# Patient Record
Sex: MALE | Race: WHITE | NOT HISPANIC OR LATINO | Employment: FULL TIME | URBAN - METROPOLITAN AREA
[De-identification: names, ages, dates, MRNs, and addresses within clinical notes are randomized per-mention and may not be internally consistent; named-entity substitution may affect disease eponyms.]

---

## 2017-01-26 ENCOUNTER — ALLSCRIPTS OFFICE VISIT (OUTPATIENT)
Dept: OTHER | Facility: OTHER | Age: 32
End: 2017-01-26

## 2018-01-13 VITALS
HEIGHT: 66 IN | DIASTOLIC BLOOD PRESSURE: 84 MMHG | BODY MASS INDEX: 28.51 KG/M2 | TEMPERATURE: 98.2 F | WEIGHT: 177.38 LBS | SYSTOLIC BLOOD PRESSURE: 136 MMHG

## 2019-02-20 ENCOUNTER — OFFICE VISIT (OUTPATIENT)
Dept: FAMILY MEDICINE CLINIC | Facility: CLINIC | Age: 34
End: 2019-02-20
Payer: COMMERCIAL

## 2019-02-20 VITALS
SYSTOLIC BLOOD PRESSURE: 120 MMHG | TEMPERATURE: 98 F | HEART RATE: 69 BPM | HEIGHT: 67 IN | BODY MASS INDEX: 27.31 KG/M2 | WEIGHT: 174 LBS | DIASTOLIC BLOOD PRESSURE: 90 MMHG | OXYGEN SATURATION: 97 %

## 2019-02-20 DIAGNOSIS — Q54.9 HYPOSPADIAS IN MALE: Primary | ICD-10-CM

## 2019-02-20 DIAGNOSIS — Z87.710 HISTORY OF HYPOSPADIAS: ICD-10-CM

## 2019-02-20 PROCEDURE — 99213 OFFICE O/P EST LOW 20 MIN: CPT | Performed by: FAMILY MEDICINE

## 2019-02-20 PROCEDURE — 3008F BODY MASS INDEX DOCD: CPT | Performed by: FAMILY MEDICINE

## 2019-02-20 NOTE — PROGRESS NOTES
Assessment/Plan:    Likely this is a recurrence of hypospadias  Unfortunately he was unable to give a urine sample in the office  We have given him a sterile urine cup and asked him to return with a sample  Urology referral was made  History of hypospadias  Likely he has recurrence  Urology referral was given       Diagnoses and all orders for this visit:    Hypospadias in male  -     Ambulatory referral to Urology; Future    History of hypospadias          Subjective:      Patient ID: Mela Burgos is a 29 y o  male  He is here with urinary symptoms which have been occurring over the past year  He notes sometimes difficulty starting the stream and sometimes the stream is intermittent  Also urinary stream sometimes stands out  He has history of microscopic blood in the urine chronically  There is history of surgery for hypospadia  when he was in grade school  He is a  and he has DOT physical exams every 2 years  When the microscopic blood was 1st detected, he had workup with ultrasound which was normal   He denies history of UTI or kidney infection  No fever or chills  No hx STD or penile discharge      The following portions of the patient's history were reviewed and updated as appropriate: allergies, current medications, past family history, past medical history, past social history, past surgical history and problem list     Review of Systems   Constitutional: Negative for chills and fever  Respiratory: Negative for cough  Cardiovascular: Negative for chest pain  Gastrointestinal: Negative for abdominal pain  Genitourinary: Positive for difficulty urinating  Negative for discharge, dysuria, frequency and urgency  Objective:      /90   Pulse 69   Temp 98 °F (36 7 °C) (Tympanic)   Ht 5' 7" (1 702 m)   Wt 78 9 kg (174 lb)   SpO2 97%   BMI 27 25 kg/m²          Physical Exam   Constitutional: He appears well-developed and well-nourished     HENT:   Head: Normocephalic and atraumatic  Cardiovascular: Normal rate and regular rhythm  Pulmonary/Chest: Effort normal and breath sounds normal    Abdominal: Soft  Bowel sounds are normal  He exhibits no mass  There is no tenderness  Nursing note and vitals reviewed

## 2019-03-07 ENCOUNTER — CONSULT (OUTPATIENT)
Dept: UROLOGY | Facility: CLINIC | Age: 34
End: 2019-03-07
Payer: COMMERCIAL

## 2019-03-07 VITALS
HEART RATE: 66 BPM | WEIGHT: 176.4 LBS | SYSTOLIC BLOOD PRESSURE: 128 MMHG | DIASTOLIC BLOOD PRESSURE: 82 MMHG | BODY MASS INDEX: 27.69 KG/M2 | HEIGHT: 67 IN

## 2019-03-07 DIAGNOSIS — Q64.33 CONGENITAL MEATAL STENOSIS: Primary | ICD-10-CM

## 2019-03-07 DIAGNOSIS — Q54.9 HYPOSPADIAS IN MALE: ICD-10-CM

## 2019-03-07 PROCEDURE — 99204 OFFICE O/P NEW MOD 45 MIN: CPT | Performed by: UROLOGY

## 2019-03-07 NOTE — LETTER
March 7, 2019     Desiree Tran MD  [id number]   [address] [facility]    Patient: Dinah Austin   YOB: 1985   Date of Visit: 3/7/2019       Dear Dr Hernadez Shall: Thank you for referring Yessy Greene to me for evaluation  Below are my notes for this consultation  If you have questions, please do not hesitate to call me  I look forward to following your patient along with you  Sincerely,        Magnolia Portillo MD        CC: No Recipients  Magnolia Portillo MD  3/7/2019  2:46 PM  Sign at close encounter  3/7/2019    Dinah Austin  1985  [id number]        Assessment  Meatal stenosis    Plan  Discussed findings with the patient  He likely has a fossa navicularis stricture related to his prior instrumentation and meatoplasty surgery  Recommendation is for cystoscopy and urethral dilation  Explained that this is typically done under local anesthetic in the office  I did offer him oral anti anxiety medication but he declined  Discussed technique, risks, benefits  He agrees and wishes to proceed with office cystoscopy and urethral dilation  Will require metal sounds to dilate the distal urethra  History of Present Illness  Monique Guerrero is a 29 y o  male with reported history of hypospadias, complaining of urinary symptoms  He has a history of childhood surgery which is reported as hypospadias repair  However on discussion with the patient, it sounds as though he had meatal stenosis and meatoplasty  He reports no problems since childhood, however for little more than 1 year, he has complained of weak urinary stream, hesitancy, straining of urine  He has no pain  He does not have gross hematuria, but reports history of microscopic hematuria in the past   He was asked to have urine sample but did not perform this          AUA SYMPTOM SCORE      Most Recent Value   AUA SYMPTOM SCORE   How often have you had a sensation of not emptying your bladder completely after you finished urinating? 1   How often have you had to urinate again less than two hours after you finished urinating? 3   How often have you found you stopped and started again several times when you urinate? 4   How often have you found it difficult to postpone urination? 0   How often have you had a weak urinary stream?  5   How often have you had to push or strain to begin urination? 1   How many times did you most typically get up to urinate from the time you went to bed at night until the time you got up in the morning? 1   Quality of Life: If you were to spend the rest of your life with your urinary condition just the way it is now, how would you feel about that?  3   AUA SYMPTOM SCORE  15          Review of Systems  Review of Systems   Constitutional: Negative  HENT: Negative  Respiratory: Negative  Cardiovascular: Negative  Gastrointestinal: Negative  Genitourinary:        As per HPI   Musculoskeletal: Negative  Skin: Negative  Neurological: Negative  Hematological: Negative  Past Medical History  History reviewed  No pertinent past medical history      Past Social History  Past Surgical History:   Procedure Laterality Date    URETHRA SURGERY      age 7 procedure to enlarge urethra       Past Family History  Family History   Problem Relation Age of Onset    No Known Problems Mother     No Known Problems Father        Past Social history  Social History     Socioeconomic History    Marital status: Single     Spouse name: Not on file    Number of children: Not on file    Years of education: Not on file    Highest education level: Not on file   Occupational History    Not on file   Social Needs    Financial resource strain: Not on file    Food insecurity:     Worry: Not on file     Inability: Not on file    Transportation needs:     Medical: Not on file     Non-medical: Not on file   Tobacco Use    Smoking status: Former Smoker     Last attempt to quit: 2012     Years since quittin 1    Smokeless tobacco: Never Used   Substance and Sexual Activity    Alcohol use: Yes     Alcohol/week: 1 2 oz     Types: 2 Standard drinks or equivalent per week     Comment: socially    Drug use: Not Currently    Sexual activity: Not on file   Lifestyle    Physical activity:     Days per week: Not on file     Minutes per session: Not on file    Stress: Not on file   Relationships    Social connections:     Talks on phone: Not on file     Gets together: Not on file     Attends Yarsanism service: Not on file     Active member of club or organization: Not on file     Attends meetings of clubs or organizations: Not on file     Relationship status: Not on file    Intimate partner violence:     Fear of current or ex partner: Not on file     Emotionally abused: Not on file     Physically abused: Not on file     Forced sexual activity: Not on file   Other Topics Concern    Not on file   Social History Narrative    Not on file     Social History     Tobacco Use   Smoking Status Former Smoker    Last attempt to quit: 2012    Years since quittin 1   Smokeless Tobacco Never Used       Current Medications  No current outpatient medications on file  No current facility-administered medications for this visit  Allergies  No Known Allergies    Past Medical History, Social History, Family History, medications and allergies were reviewed  Vitals  Vitals:    19 1412   BP: 128/82   BP Location: Left arm   Patient Position: Sitting   Cuff Size: Adult   Pulse: 66   Weight: 80 kg (176 lb 6 4 oz)   Height: 5' 7" (1 702 m)       Physical Exam  Physical Exam   Constitutional: He is oriented to person, place, and time  He appears well-developed and well-nourished  Cardiovascular: Normal rate  Pulmonary/Chest: Effort normal    Abdominal: Soft     Genitourinary:   Genitourinary Comments: Meatus in appropriate location, which indicates that he did not have hypospadias  On palpation there is a firmness proximal to the meatus likely in the area of the fossa navicularis  Testes descended and normal bilaterally  Musculoskeletal: Normal range of motion  Neurological: He is alert and oriented to person, place, and time  Skin: Skin is warm, dry and intact  Psychiatric: He has a normal mood and affect  Vitals reviewed          Results  No results found for: PSA  Lab Results   Component Value Date    GLUCOSE 98 02/03/2014    CALCIUM 9 1 02/03/2014     02/03/2014    K 4 1 02/03/2014    CO2 32 (H) 02/03/2014     02/03/2014    BUN 13 02/03/2014    CREATININE 0 95 02/03/2014     Lab Results   Component Value Date    WBC 6 42 02/03/2014    HGB 15 9 02/03/2014    HCT 47 8 02/03/2014    MCV 95 02/03/2014     02/03/2014

## 2019-03-07 NOTE — PROGRESS NOTES
3/7/2019    Dipika Amato  1985  [id number]        Assessment  Meatal stenosis    Plan  Discussed findings with the patient  He likely has a fossa navicularis stricture related to his prior instrumentation and meatoplasty surgery  Recommendation is for cystoscopy and urethral dilation  Explained that this is typically done under local anesthetic in the office  I did offer him oral anti anxiety medication but he declined  Discussed technique, risks, benefits  He agrees and wishes to proceed with office cystoscopy and urethral dilation  Will require metal sounds to dilate the distal urethra  History of Present Illness  Adiel Moore is a 29 y o  male with reported history of hypospadias, complaining of urinary symptoms  He has a history of childhood surgery which is reported as hypospadias repair  However on discussion with the patient, it sounds as though he had meatal stenosis and meatoplasty  He reports no problems since childhood, however for little more than 1 year, he has complained of weak urinary stream, hesitancy, straining of urine  He has no pain  He does not have gross hematuria, but reports history of microscopic hematuria in the past   He was asked to have urine sample but did not perform this  AUA SYMPTOM SCORE      Most Recent Value   AUA SYMPTOM SCORE   How often have you had a sensation of not emptying your bladder completely after you finished urinating? 1   How often have you had to urinate again less than two hours after you finished urinating? 3   How often have you found you stopped and started again several times when you urinate? 4   How often have you found it difficult to postpone urination? 0   How often have you had a weak urinary stream?  5   How often have you had to push or strain to begin urination? 1   How many times did you most typically get up to urinate from the time you went to bed at night until the time you got up in the morning?   1   Quality of Life: If you were to spend the rest of your life with your urinary condition just the way it is now, how would you feel about that?  3   AUA SYMPTOM SCORE  15          Review of Systems  Review of Systems   Constitutional: Negative  HENT: Negative  Respiratory: Negative  Cardiovascular: Negative  Gastrointestinal: Negative  Genitourinary:        As per HPI   Musculoskeletal: Negative  Skin: Negative  Neurological: Negative  Hematological: Negative  Past Medical History  History reviewed  No pertinent past medical history  Past Social History  Past Surgical History:   Procedure Laterality Date    URETHRA SURGERY      age 7 procedure to enlarge urethra       Past Family History  Family History   Problem Relation Age of Onset    No Known Problems Mother     No Known Problems Father        Past Social history  Social History     Socioeconomic History    Marital status: Single     Spouse name: Not on file    Number of children: Not on file    Years of education: Not on file    Highest education level: Not on file   Occupational History    Not on file   Social Needs    Financial resource strain: Not on file    Food insecurity:     Worry: Not on file     Inability: Not on file    Transportation needs:     Medical: Not on file     Non-medical: Not on file   Tobacco Use    Smoking status: Former Smoker     Last attempt to quit: 2012     Years since quittin 1    Smokeless tobacco: Never Used   Substance and Sexual Activity    Alcohol use:  Yes     Alcohol/week: 1 2 oz     Types: 2 Standard drinks or equivalent per week     Comment: socially    Drug use: Not Currently    Sexual activity: Not on file   Lifestyle    Physical activity:     Days per week: Not on file     Minutes per session: Not on file    Stress: Not on file   Relationships    Social connections:     Talks on phone: Not on file     Gets together: Not on file     Attends [employer] service: Not on file Active member of club or organization: Not on file     Attends meetings of clubs or organizations: Not on file     Relationship status: Not on file    Intimate partner violence:     Fear of current or ex partner: Not on file     Emotionally abused: Not on file     Physically abused: Not on file     Forced sexual activity: Not on file   Other Topics Concern    Not on file   Social History Narrative    Not on file     Social History     Tobacco Use   Smoking Status Former Smoker    Last attempt to quit: 2012    Years since quittin 1   Smokeless Tobacco Never Used       Current Medications  No current outpatient medications on file  No current facility-administered medications for this visit  Allergies  No Known Allergies    Past Medical History, Social History, Family History, medications and allergies were reviewed  Vitals  Vitals:    19 1412   BP: 128/82   BP Location: Left arm   Patient Position: Sitting   Cuff Size: Adult   Pulse: 66   Weight: 80 kg (176 lb 6 4 oz)   Height: 5' 7" (1 702 m)       Physical Exam  Physical Exam   Constitutional: He is oriented to person, place, and time  He appears well-developed and well-nourished  Cardiovascular: Normal rate  Pulmonary/Chest: Effort normal    Abdominal: Soft  Genitourinary:   Genitourinary Comments: Meatus in appropriate location, which indicates that he did not have hypospadias  On palpation there is a firmness proximal to the meatus likely in the area of the fossa navicularis  Testes descended and normal bilaterally  Musculoskeletal: Normal range of motion  Neurological: He is alert and oriented to person, place, and time  Skin: Skin is warm, dry and intact  Psychiatric: He has a normal mood and affect  Vitals reviewed          Results  No results found for: PSA  Lab Results   Component Value Date    GLUCOSE 98 2014    CALCIUM 9 1 2014     2014    K 4 1 2014    CO2 32 (H) 02/03/2014     02/03/2014    BUN 13 02/03/2014    CREATININE 0 95 02/03/2014     Lab Results   Component Value Date    WBC 6 42 02/03/2014    HGB 15 9 02/03/2014    HCT 47 8 02/03/2014    MCV 95 02/03/2014     02/03/2014

## 2019-04-23 ENCOUNTER — PROCEDURE VISIT (OUTPATIENT)
Dept: UROLOGY | Facility: CLINIC | Age: 34
End: 2019-04-23
Payer: COMMERCIAL

## 2019-04-23 VITALS
HEIGHT: 67 IN | HEART RATE: 64 BPM | SYSTOLIC BLOOD PRESSURE: 130 MMHG | DIASTOLIC BLOOD PRESSURE: 86 MMHG | BODY MASS INDEX: 27.47 KG/M2 | WEIGHT: 175 LBS

## 2019-04-23 DIAGNOSIS — Q64.33 CONGENITAL MEATAL STENOSIS: Primary | ICD-10-CM

## 2019-04-23 LAB
POST-VOID RESIDUAL VOLUME, ML POC: 58 ML
SL AMB  POCT GLUCOSE, UA: NORMAL
SL AMB LEUKOCYTE ESTERASE,UA: NORMAL
SL AMB POCT BILIRUBIN,UA: NORMAL
SL AMB POCT BLOOD,UA: NORMAL
SL AMB POCT CLARITY,UA: CLEAR
SL AMB POCT COLOR,UA: YELLOW
SL AMB POCT KETONES,UA: NORMAL
SL AMB POCT NITRITE,UA: NORMAL
SL AMB POCT PH,UA: 7
SL AMB POCT SPECIFIC GRAVITY,UA: 1
SL AMB POCT URINE PROTEIN: NORMAL
SL AMB POCT UROBILINOGEN: 0.2

## 2019-04-23 PROCEDURE — 51741 ELECTRO-UROFLOWMETRY FIRST: CPT | Performed by: UROLOGY

## 2019-04-23 PROCEDURE — 81002 URINALYSIS NONAUTO W/O SCOPE: CPT | Performed by: UROLOGY

## 2019-04-23 PROCEDURE — 52000 CYSTOURETHROSCOPY: CPT | Performed by: UROLOGY

## 2019-04-23 PROCEDURE — 51798 US URINE CAPACITY MEASURE: CPT | Performed by: UROLOGY

## 2019-12-23 ENCOUNTER — TELEPHONE (OUTPATIENT)
Dept: FAMILY MEDICINE CLINIC | Facility: CLINIC | Age: 34
End: 2019-12-23

## 2019-12-23 NOTE — TELEPHONE ENCOUNTER
Please attempt to call and schedule pt for a Physical  Last seen was 2/2019  Had and appt schedule in sept 2019 and had canceled  Has no pending appt scheduled as of today 12/23/2019  Thank you

## 2021-06-04 ENCOUNTER — OFFICE VISIT (OUTPATIENT)
Dept: URGENT CARE | Age: 36
End: 2021-06-04
Payer: COMMERCIAL

## 2021-06-04 VITALS
TEMPERATURE: 98.2 F | BODY MASS INDEX: 28.09 KG/M2 | OXYGEN SATURATION: 97 % | WEIGHT: 179 LBS | HEIGHT: 67 IN | HEART RATE: 76 BPM | SYSTOLIC BLOOD PRESSURE: 158 MMHG | RESPIRATION RATE: 16 BRPM | DIASTOLIC BLOOD PRESSURE: 92 MMHG

## 2021-06-04 DIAGNOSIS — H10.32 ACUTE CONJUNCTIVITIS OF LEFT EYE, UNSPECIFIED ACUTE CONJUNCTIVITIS TYPE: Primary | ICD-10-CM

## 2021-06-04 PROCEDURE — G0382 LEV 3 HOSP TYPE B ED VISIT: HCPCS | Performed by: PHYSICIAN ASSISTANT

## 2021-06-04 RX ORDER — OFLOXACIN 3 MG/ML
1 SOLUTION/ DROPS OPHTHALMIC 4 TIMES DAILY
Qty: 5 ML | Refills: 0 | Status: SHIPPED | OUTPATIENT
Start: 2021-06-04

## 2021-06-04 RX ORDER — TETRACAINE HYDROCHLORIDE 5 MG/ML
1 SOLUTION OPHTHALMIC ONCE
Status: COMPLETED | OUTPATIENT
Start: 2021-06-04 | End: 2021-06-04

## 2021-06-04 RX ADMIN — TETRACAINE HYDROCHLORIDE 1 DROP: 5 SOLUTION OPHTHALMIC at 15:21

## 2021-06-04 NOTE — PROGRESS NOTES
[facility]        NAME: Komal Ji is a 29 y o  male  : 1985    MRN: [id number]  DATE: 2021  TIME: 3:26 PM    Assessment and Plan   Acute conjunctivitis of left eye, unspecified acute conjunctivitis type [H10 32]  1  Acute conjunctivitis of left eye, unspecified acute conjunctivitis type  tetracaine 0 5 % ophthalmic solution 1 drop    fluorescein sodium sterile 1 mg ophthalmic strip 1 strip    ofloxacin (OCUFLOX) 0 3 % ophthalmic solution         Patient Instructions       [facility]  Address: [address] [place] [address] [place], [facility], [address] [facility]  Phone: [phone]    Continue to monitor symptoms  If new or worsening symptoms develop, go immediately to Er  Drink plenty of fluids  Follow up with Eye Doctor this week  Chief Complaint     Chief Complaint   Patient presents with    Eye Pain     L eye pain/burning/tearful/hazy vision +5dys  No injury  Was wearing contacts  History of Present Illness       Eye Pain   The left eye is affected  This is a new problem  Episode onset: 5 days ago  Improved but last night it started to hurt more  The problem has been gradually worsening  Injury mechanism: No specific injury or trauma  The pain is moderate  There is no known exposure to pink eye  He wears contacts  Associated symptoms include an eye discharge, eye redness, a foreign body sensation, itching and photophobia  Pertinent negatives include no blurred vision, double vision, fever, nausea, recent URI or vomiting  He has tried nothing for the symptoms  The treatment provided no relief  Review of Systems   Review of Systems   Constitutional: Negative for chills, fatigue and fever  Eyes: Positive for photophobia, pain, discharge, redness and itching  Negative for blurred vision and double vision  Respiratory: Negative for chest tightness, shortness of breath and wheezing  Cardiovascular: Negative for chest pain and palpitations     Gastrointestinal: Negative for abdominal pain, nausea and vomiting  Musculoskeletal: Negative for back pain, myalgias and neck pain  Skin: Negative for pallor and rash  Current Medications       Current Outpatient Medications:     ofloxacin (OCUFLOX) 0 3 % ophthalmic solution, Administer 1 drop to both eyes 4 (four) times a day, Disp: 5 mL, Rfl: 0  No current facility-administered medications for this visit  Current Allergies     Allergies as of 06/04/2021    (No Known Allergies)            The following portions of the patient's history were reviewed and updated as appropriate: allergies, current medications, past family history, past medical history, past social history, past surgical history and problem list      History reviewed  No pertinent past medical history  Past Surgical History:   Procedure Laterality Date    URETHRA SURGERY      age 7 procedure to enlarge urethra       Family History   Problem Relation Age of Onset    No Known Problems Mother     No Known Problems Father          Medications have been verified  Objective   /92   Pulse 76   Temp 98 2 °F (36 8 °C)   Resp 16   Ht 5' 7" (1 702 m)   Wt 81 2 kg (179 lb)   SpO2 97%   BMI 28 04 kg/m²        Physical Exam     Physical Exam  Vitals signs and nursing note reviewed  Constitutional:       General: He is not in acute distress  Appearance: Normal appearance  He is well-developed  He is not ill-appearing or diaphoretic  HENT:      Head: Normocephalic and atraumatic  Right Ear: External ear normal       Left Ear: External ear normal    Eyes:      General: Lids are normal  Lids are everted, no foreign bodies appreciated  Vision grossly intact  No allergic shiner  Right eye: No foreign body, discharge or hordeolum  Left eye: Discharge (tearing) present  No foreign body or hordeolum  Extraocular Movements: Extraocular movements intact  Right eye: Normal extraocular motion and no nystagmus  Left eye: Normal extraocular motion and no nystagmus  Conjunctiva/sclera:      Right eye: Right conjunctiva is not injected  No chemosis, exudate or hemorrhage  Left eye: Left conjunctiva is injected  No chemosis, exudate or hemorrhage  Pupils: Pupils are equal, round, and reactive to light  Slit lamp exam:     Left eye: No corneal flare, corneal ulcer or foreign body  Neck:      Musculoskeletal: Normal range of motion and neck supple  Cardiovascular:      Rate and Rhythm: Normal rate and regular rhythm  Heart sounds: Normal heart sounds  Pulmonary:      Effort: Pulmonary effort is normal  No respiratory distress  Breath sounds: Normal breath sounds  No wheezing or rales  Lymphadenopathy:      Cervical: No cervical adenopathy  Skin:     General: Skin is warm  Findings: No rash  Neurological:      Mental Status: He is alert

## 2021-06-04 NOTE — PATIENT INSTRUCTIONS
[facility]  Address: [address] [facility] [place], [facility], [address]   Phone: [phone]    Continue to monitor symptoms  If new or worsening symptoms develop, go immediately to Er  Drink plenty of fluids  Follow up with Eye Doctor this week  Conjunctivitis   WHAT YOU SHOULD KNOW:   Conjunctivitis, or pink eye, is inflammation of your conjunctiva  The conjunctiva is a thin tissue that covers the front of your eye and the back of your eyelids  The conjunctiva helps protect your eye and keep it moist         INSTRUCTIONS:   Medicines:   · Allergy medicine: This medicine helps decrease itchy, red, swollen eyes caused by allergies  It may be given as a pill, eye drops, or nasal spray  · Antibiotics:  You will need antibiotics if your conjunctivitis is caused by bacteria  This medicine may be given as eye drops or eye ointment  · Steroid medicine: This medicine helps decrease inflammation  It may be given as a pill, eye drops, or nasal spray  · Take your medicine as directed  Call your healthcare provider if you think your medicine is not helping or if you have side effects  Tell him if you are allergic to any medicine  Keep a list of the medicines, vitamins, and herbs you take  Include the amounts, and when and why you take them  Bring the list or the pill bottles to follow-up visits  Carry your medicine list with you in case of an emergency  Follow up with your primary healthcare provider as directed: You may need to return for more tests on your eyes  These will help your primary healthcare provider check for eye damage  Write down your questions so you remember to ask them during your visits  Avoid the spread of conjunctivitis:   · Wash your hands often:  Wash your hands before you touch your eyes  Also wash your hands before you prepare or eat food and after you use the bathroom or change a diaper      · Avoid allergens:  Try to avoid the things that cause your allergies, such as pets, dust, or grass  · Avoid contact:  Do not share towels or washcloths  Try to stay away from others as much as possible  Ask when you can return to work or school  · Throw away eye makeup:  Throw away mascara and other eye makeup  Manage your symptoms:  · Apply a cool compress:  Wet a washcloth with cold water and place it on your eye  This will help decrease swelling  · Use eye drops:  Eye drops, or artificial tears, can be bought without a doctor's order  They help keep your eye moist     · Do not wear contact lenses: They can irritate your eye  Throw away the pair you are using and ask when you can wear them again  Use a new pair of lenses when your primary healthcare provider says it is okay  · Flush your eye:  You may need to flush your eye with saline to help decrease your symptoms  Ask for more information on how to flush your eye  Contact your primary healthcare provider if:   · Your eyesight becomes blurry  · You have tiny bumps or spots of blood on your eye  · You have questions or concerns about your condition or care  Return to the emergency department if:   · The swelling in your eye gets worse, even after treatment  · Your vision suddenly becomes worse or you cannot see at all  · Your eye begins to bleed  © 2014 [address] is for End User's use only and may not be sold, redistributed or otherwise used for commercial purposes  All illustrations and images included in CareNotes® are the copyrighted property of [employer]  or Everett Kwong  The above information is an  only  It is not intended as medical advice for individual conditions or treatments  Talk to your doctor, nurse or pharmacist before following any medical regimen to see if it is safe and effective for you

## 2022-10-21 ENCOUNTER — AMB VIDEO VISIT (OUTPATIENT)
Dept: OTHER | Facility: HOSPITAL | Age: 37
End: 2022-10-21

## 2022-10-21 DIAGNOSIS — U07.1 COVID-19: Primary | ICD-10-CM

## 2022-10-21 PROBLEM — Z28.21 COVID-19 VACCINATION DECLINED: Status: ACTIVE | Noted: 2022-10-21

## 2022-10-21 PROBLEM — Q64.33 CONGENITAL MEATAL STENOSIS: Status: RESOLVED | Noted: 2019-02-20 | Resolved: 2022-10-21

## 2022-10-21 RX ORDER — NIRMATRELVIR AND RITONAVIR 150-100 MG
2 KIT ORAL 2 TIMES DAILY
Qty: 20 TABLET | Refills: 0 | Status: SHIPPED | OUTPATIENT
Start: 2022-10-21 | End: 2022-10-26

## 2022-10-21 RX ORDER — MULTIVIT WITH MINERALS/LUTEIN
1000 TABLET ORAL 2 TIMES DAILY
Qty: 14 TABLET | Refills: 0 | Status: SHIPPED | OUTPATIENT
Start: 2022-10-21 | End: 2022-10-28

## 2022-10-21 RX ORDER — BENZONATATE 200 MG/1
200 CAPSULE ORAL 3 TIMES DAILY PRN
Qty: 20 CAPSULE | Refills: 0 | Status: SHIPPED | OUTPATIENT
Start: 2022-10-21

## 2022-10-21 RX ORDER — MULTIVITAMIN
1 CAPSULE ORAL DAILY
Qty: 30 CAPSULE | Refills: 0 | Status: SHIPPED | OUTPATIENT
Start: 2022-10-21 | End: 2022-11-20

## 2022-10-21 RX ORDER — MELATONIN
2000 DAILY
Qty: 14 TABLET | Refills: 0 | Status: SHIPPED | OUTPATIENT
Start: 2022-10-21 | End: 2022-10-28

## 2022-10-21 NOTE — CARE ANYWHERE EVISITS
Visit Summary for Dave Shirley - Gender: Male - Date of Birth: [id number]  Date: [id number] - Duration: 14 minutes  Patient: Dave Shirley  Provider: Semaj Sheehan PA-C    Patient Contact Information  Address  [address]; [address] [facility]  [phone]    Visit Topics  COVID positive [Added ByEllison Slider - [id number]]    Triage Questions   What is your current physical address in the event of a medical emergency? Answer []  Are you allergic to any medications? Answer []  Are you now or could you be pregnant? Answer []  Do you have any immune system compromise or chronic lung   disease? Answer []  Do you have any vulnerable family members in the home (infant, pregnant, cancer, elderly)? Answer []     Conversation Transcripts  [0A][0A] [Notification] You are connected with Semaj SHEEHAN, Urgent Care Specialist [0A][Notification] Margret Fitzpatrick is located in [place]  [0A][Notification] Margret Fitzpatrick has shared health history  Filiberto Pandey  [0A]    Diagnosis  COVID-19    Procedures  Value: [id number] Code: CPT-4 UNLISTED E&M SERVICE    Medications Prescribed    No prescriptions ordered    Electronically signed by: Pema Berry(NPI [id number])

## 2022-10-21 NOTE — PROGRESS NOTES
Video Visit - Sonny Peck 40 y o  male MRN: [id number]    REQUIRED DOCUMENTATION:         1  This service was provided via [employer]  2  Provider located at [address] [place] [id number] [phone]  3  [facility] provider: Jonathan Cruz PA-C   4  Identify all parties in [facility]:  No one else  5  After connecting through [employer], patient was identified by name and date of birth  Patient was then informed that this was a Telemedicine visit and that the exam was being conducted confidentially over secure lines  My office door was closed  No one else was in the room  Patient acknowledged consent and understanding of privacy and security of the Telemedicine visit  I informed the patient that I have reviewed their record in Epic and presented the opportunity for them to ask any questions regarding the visit today  The patient agreed to participate  VITALS: Heart Rate: 76 BPM, Respiratory Rate: 16 RPM, Temperature 99 7° F, Blood Pressure Unavailable mmHg, Pulse Ox Unavailable % on RA    HPI  Pt reports he was feeling ill last night- body aches, chills, sore throat, dry cough, fever today  Today started taking tylenol cold and flu with some relief, last dose 1330 (temp was 101 9)  He is unvaccinated  He is COVID positive  Denies CP or SOB  Not a smoker  Physical Exam  Constitutional:       General: He is not in acute distress  Appearance: Normal appearance  He is normal weight  He is not toxic-appearing  HENT:      Head: Normocephalic and atraumatic  Nose: No rhinorrhea  Mouth/Throat:      Mouth: Mucous membranes are moist    Eyes:      Conjunctiva/sclera: Conjunctivae normal    Cardiovascular:      Rate and Rhythm: Normal rate  Pulmonary:      Effort: Pulmonary effort is normal  No respiratory distress  Breath sounds: No wheezing (no gross audible wheeze through computer)     Musculoskeletal:      Cervical back: Normal range of motion  Skin:     Findings: No rash (on face or neck)  Neurological:      Mental Status: He is alert  Cranial Nerves: No dysarthria or facial asymmetry  Psychiatric:         Mood and Affect: Mood normal          Behavior: Behavior normal          Diagnoses and all orders for this visit:    COVID-19  -     cholecalciferol (VITAMIN D3) 1,000 units tablet; Take 2 tablets (2,000 Units total) by mouth daily for 7 days  -     Ascorbic Acid (vitamin C) 1000 MG tablet; Take 1 tablet (1,000 mg total) by mouth 2 (two) times a day for 7 days  -     Multiple Vitamin (multivitamin) capsule; Take 1 capsule by mouth daily  -     Pulse Oximeter  -     nirmatrelvir & ritonavir (Paxlovid, 150/100,) tablet therapy pack; Take 2 tablets by mouth 2 (two) times a day for 5 days Take 1 nirmatrelvir tablet + 1 ritonavir tablet together per dose  -     benzonatate (TESSALON) 200 MG capsule; Take 1 capsule (200 mg total) by mouth 3 (three) times a day as needed for cough      Patient Instructions   Please Be Courteous:  You have COVID-19  Day 0 is your first day of symptoms  Day 1 is the first full day after your symptoms started  You should isolate yourself away from other through day 5 since this is when you are likely most infectious  This means go home and stay home  In Your Home:  If you live with other people, trying to avoid common spaces and disinfect areas that you come into contact with  Per the CDC's recommendations: Use a separate bedroom and bathroom if feasible  If If other people in your home are concerned they may have covid, isolation should begin even before seeking testing and before test results become available  All household members should start wearing a mask in the home, particularly in shared spaces where appropriate distancing is not possible  Take Care of Yourself:  Schedule a virtual visit with your primary care physician as soon as possible   Try to sleep on your stomach as much as possible  If you have the ability to, take vitamin D3 2000 IU daily, vitamin-C 1000 mg twice a day, a multivitamin daily to help boost your immune system  You should check your temperature twice day  Go to the emergency department for any severe shortness of breath, chest pain or pulse ox less than 90%  Isolation: Everyone, regardless of vaccination status: You may end isolation after day 5 if:  · You are fever-free for 24 hours (without the use of fever-reducing medication)    · Your symptoms are improving    If you still have fever or your other symptoms have not improved, continue to isolate until they improve  · If you had?moderate illness?(if you experienced shortness of breath or had difficulty breathing), or?severe illness?(you were hospitalized) due to COVID-19, or you have a weakened immune system, you need to isolate through day 10  · If you had?severe illness?or have a weakened immune system, consult your doctor before ending isolation  Ending isolation without a viral test may not be an option for you  Nirmatrelvir/Ritonavir (By mouth)   Nirmatrelvir (sot-XP-pqeu-vir), Ritonavir (pbb-KH-qq-vir)  Treats coronavirus disease 2019 (COVID-19)  Brand Name(s):   There may be other brand names for this medicine  When This Medicine Should Not Be Used: This medicine is not right for everyone  Do not use it if you had an allergic reaction to nirmatrelvir or ritonavir  How to Use This Medicine:   Tablet  · Your doctor will tell you how much medicine to use  Do not use more than directed  Take this medicine within 5 days of the onset of COVID-19 symptoms  · Swallow the tablet whole  Do not crush, break, or chew it  · This medicine comes with a Fact Sheet for Patients, Parents, and Caregivers  Read and follow the information in the Fact Sheet carefully  Ask your doctor if you have any questions  · Missed dose:  If it is within 8 hours of the time it is usually taken, take it as soon as possible, and then go back to your regular schedule  If it is more than 8 hours, skip the missed dose and take your next dose at the regular time  Do not use extra medicine to make up for a missed dose  · Store the medicine in a closed container at room temperature, away from heat, moisture, and direct light  Drugs and Foods to Avoid:   Ask your doctor or pharmacist before using any other medicine, including over-the-counter medicines, vitamins, and herbal products  1  Do not use this medicine if you are also using alfuzosin, apalutamide, clozapine, colchicine, lovastatin, lurasidone, midazolam, pethidine, pimozide, piroxicam, propoxyphene, ranolazine, rifampin, salmeterol, sildenafil, simvastatin, Bonnie's wort, triazolam, ergot medicine (including dihydroergotamine, ergotamine, methylergonovine), medicine for heart rhythm problems (including amiodarone, dronedarone, flecainide, propafenone, quinidine), or seizure medicine (including carbamazepine, phenobarbital, phenytoin)  2  Some medicines can affect how nirmatrelvir/ritonavir works  Tell your doctor if you are using any of the following:  ? Atorvastatin, bedaquiline, bepridil, bosentan, cyclosporine, dasabuvir, digoxin, elbasvir/grazoprevir, fentanyl, glecaprevir/pibrentasvir, lidocaine, methadone, ombitasvir/paritaprevir/ritonavir, quetiapine, rifabutin, rosuvastatin, sirolimus, sofosbuvir/velpatasvir/voxilaprevir, tacrolimus  ? Birth control pills (including ethinyl estradiol)  ? Blood pressure medicine (including amlodipine, diltiazem, felodipine, nicardipine, nifedipine)  ? Blood thinner (including rivaroxaban, warfarin)  ? Medicine to treat cancer (including abemaciclib, ceritinib, dasatinib, encorafenib, ibrutinib, ivosidenib, neratinib, nilotinib, venetoclax, vinblastine, vincristine)  ? Medicine to treat depression (including bupropion, trazodone)  ?  Medicine to treat HIV (including amprenavir, atazanavir, bictegravir, darunavir, delavirdine, didanosine, efavirenz, emtricitabine/tenofovir, fosamprenavir, indinavir, maraviroc, nelfinavir, raltegravir, saquinavir, tipranavir, tenofovir, zidovudine)  ? Medicine to treat infections (including clarithromycin, erythromycin, isavuconazonium, itraconazole, ketoconazole, voriconazole)  ? Steroid medicine (including betamethasone, budesonide, ciclesonide, dexamethasone, fluticasone, methylprednisolone, mometasone, prednisone, triamcinolone)  Warnings While Using This Medicine:   · Tell your doctor if you are pregnant or planning to become pregnant  Birth control pills may not work as well to prevent pregnancy when used with this medicine  Use another form of birth control (including condoms or spermicide) along with your pills  · Tell your doctor if you are breastfeeding, or if you have kidney disease, liver disease, or HIV infection  · This medicine may cause liver problems  · Your doctor will do lab tests at regular visits to check on the effects of this medicine  Keep all appointments  · Keep all medicine out of the reach of children  Never share your medicine with anyone  Possible Side Effects While Using This Medicine: If you notice these less serious side effects, talk with your doctor:  · Allergic reaction: Itching or hives, swelling in your face or hands, swelling or tingling in your mouth or throat, chest tightness, trouble breathing  · Blurred vision, severe headache, slow or fast heartbeat, lightheadedness, dizziness  · Dark urine or pale stools, nausea, vomiting, loss of appetite, stomach pain, yellow skin or eyes  If you notice these less serious side effects, talk with your doctor:  · Change or loss of taste  · Diarrhea  · Muscle pain  If you notice other side effects that you think are caused by this medicine, tell your doctor  Call your doctor for medical advice about side effects   You may report side effects to FDA at 8-356-FDA-1794    © Copyright [employer] 2022 Information is for End User's use only and may not be sold, redistributed or otherwise used for commercial purposes  The above information is an  only  It is not intended as medical advice for individual conditions or treatments  Talk to your doctor, nurse or pharmacist before following any medical regimen to see if it is safe and effective for you

## 2022-10-21 NOTE — LETTER
October 21, 2022    Patient: Clark Woods  YOB: 1985  Date of Last Encounter: 10/21/2022    To whom it may concern:     Clark Woods has tested positive for COVID-19 (Coronavirus)  He may return to work on 10/26/22, which is 5 days from illness onset (provided symptoms are improving and 24 hours without fever ) Must mask for an additional 5 days      Sincerely,         Prince Francisco PA-C

## 2023-12-31 ENCOUNTER — OFFICE VISIT (OUTPATIENT)
Dept: URGENT CARE | Facility: MEDICAL CENTER | Age: 38
End: 2023-12-31
Payer: COMMERCIAL

## 2023-12-31 VITALS
RESPIRATION RATE: 18 BRPM | OXYGEN SATURATION: 99 % | DIASTOLIC BLOOD PRESSURE: 93 MMHG | TEMPERATURE: 97.6 F | HEART RATE: 77 BPM | SYSTOLIC BLOOD PRESSURE: 136 MMHG

## 2023-12-31 DIAGNOSIS — J40 BRONCHITIS: Primary | ICD-10-CM

## 2023-12-31 DIAGNOSIS — R06.02 SOB (SHORTNESS OF BREATH): ICD-10-CM

## 2023-12-31 LAB
SARS-COV-2 AG UPPER RESP QL IA: NEGATIVE
VALID CONTROL: NORMAL

## 2023-12-31 PROCEDURE — G0381 LEV 2 HOSP TYPE B ED VISIT: HCPCS | Performed by: FAMILY MEDICINE

## 2023-12-31 PROCEDURE — 87811 SARS-COV-2 COVID19 W/OPTIC: CPT | Performed by: FAMILY MEDICINE

## 2023-12-31 RX ORDER — PREDNISONE 20 MG/1
40 TABLET ORAL DAILY
Qty: 6 TABLET | Refills: 0 | Status: SHIPPED | OUTPATIENT
Start: 2023-12-31 | End: 2024-01-03

## 2023-12-31 RX ORDER — BENZONATATE 200 MG/1
200 CAPSULE ORAL 3 TIMES DAILY PRN
Qty: 20 CAPSULE | Refills: 0 | Status: SHIPPED | OUTPATIENT
Start: 2023-12-31

## 2023-12-31 RX ORDER — ALBUTEROL SULFATE 90 UG/1
2 AEROSOL, METERED RESPIRATORY (INHALATION) EVERY 4 HOURS PRN
Qty: 18 G | Refills: 0 | Status: SHIPPED | OUTPATIENT
Start: 2023-12-31

## 2023-12-31 NOTE — PROGRESS NOTES
[facility]        NAME: Randolph Morillo is a 38 y.o. male  : 1985    MRN: [id number]  DATE: 2023  TIME: 9:48 AM    Assessment and Plan   Bronchitis [J40]  1. Bronchitis  albuterol (ProAir HFA) 90 mcg/act inhaler    predniSONE 20 mg tablet    benzonatate (TESSALON) 200 MG capsule      2. SOB (shortness of breath)  Poct Covid 19 Rapid Antigen Test          Patient Instructions     Follow up with PCP in 3-5 days.  Proceed to  ER if symptoms worsen.    Chief Complaint     Chief Complaint   Patient presents with   • Cold Like Symptoms     Patient c/o fever, sore throat, SOB and congestion x 5 days      History of Present Illness   HPI  Randolph Morillo is a 38 y.o. male  who [facility] with sx of nasal congestion, sore throat, and cough that started about 5 days ago.  He initially had some fevers, but that has subsided.  Now the sx have progressively worsened with increased sore throat, and a feeling or swelling and phlegm in the throat making it difficult to breath at times, and chest tightness with some shortness of breath.  + h/o Asthma in childhood, + allergies  Son is sick at home with +RSV      Review of Systems   Review of Systems   Constitutional:  Positive for chills. Negative for fever.   HENT:  Positive for congestion, rhinorrhea and sore throat.    Respiratory:  Positive for cough, chest tightness and shortness of breath.    Cardiovascular:  Negative for chest pain.   Gastrointestinal:  Negative for diarrhea, nausea and vomiting.   Skin:  Negative for rash.   Neurological:  Negative for dizziness and headaches.     Current Medications       Current Outpatient Medications:   •  albuterol (ProAir HFA) 90 mcg/act inhaler, Inhale 2 puffs every 4 (four) hours as needed for shortness of breath, Disp: 18 g, Rfl: 0  •  benzonatate (TESSALON) 200 MG capsule, Take 1 capsule (200 mg total) by mouth 3 (three) times a day as needed for cough, Disp: 20 capsule,  Rfl: 0  •  predniSONE 20 mg tablet, Take 2 tablets (40 mg total) by mouth daily for 3 days, Disp: 6 tablet, Rfl: 0  •  Ascorbic Acid (vitamin C) 1000 MG tablet, Take 1 tablet (1,000 mg total) by mouth 2 (two) times a day for 7 days, Disp: 14 tablet, Rfl: 0  •  cholecalciferol (VITAMIN D3) 1,000 units tablet, Take 2 tablets (2,000 Units total) by mouth daily for 7 days, Disp: 14 tablet, Rfl: 0  •  Multiple Vitamin (multivitamin) capsule, Take 1 capsule by mouth daily, Disp: 30 capsule, Rfl: 0    Current Allergies     Allergies as of 12/31/2023   • (No Known Allergies)          The following portions of the patient's history were reviewed and updated as appropriate: allergies, current medications, past family history, past medical history, past social history, past surgical history and problem list.     Past Medical History:   Diagnosis Date   • Congenital meatal stenosis 2/20/2019       Past Surgical History:   Procedure Laterality Date   • URETHRA SURGERY      age 9 procedure to enlarge urethra       Family History   Problem Relation Age of Onset   • No Known Problems Mother    • No Known Problems Father      Medications have been verified.    Objective   /93   Pulse 77   Temp 97.6 °F (36.4 °C)   Resp 18   SpO2 99%   No LMP for male patient.       Physical Exam     Physical Exam  Vitals and nursing note reviewed.   Constitutional:       Appearance: He is well-developed.   HENT:      Head: Normocephalic and atraumatic.      Right Ear: Tympanic membrane, ear canal and external ear normal.      Left Ear: Tympanic membrane, ear canal and external ear normal.      Nose: Congestion present.      Mouth/Throat:      Pharynx: Posterior oropharyngeal erythema present.   Eyes:      Conjunctiva/sclera: Conjunctivae normal.   Cardiovascular:      Rate and Rhythm: Normal rate and regular rhythm.      Heart sounds: Normal heart sounds.   Pulmonary:      Effort: Pulmonary effort is normal. Prolonged expiration present. No  respiratory distress.      Breath sounds: Examination of the right-lower field reveals decreased breath sounds. Examination of the left-lower field reveals decreased breath sounds. Decreased breath sounds present.   Neurological:      Mental Status: He is alert and oriented to person, place, and time.   Psychiatric:         Mood and Affect: Mood normal.         Behavior: Behavior normal.

## 2024-09-08 ENCOUNTER — OFFICE VISIT (OUTPATIENT)
Dept: URGENT CARE | Facility: MEDICAL CENTER | Age: 39
End: 2024-09-08
Payer: COMMERCIAL

## 2024-09-08 VITALS
OXYGEN SATURATION: 97 % | SYSTOLIC BLOOD PRESSURE: 134 MMHG | DIASTOLIC BLOOD PRESSURE: 87 MMHG | RESPIRATION RATE: 18 BRPM | HEART RATE: 63 BPM | TEMPERATURE: 97.7 F

## 2024-09-08 DIAGNOSIS — H00.036 CELLULITIS OF LEFT EYELID: Primary | ICD-10-CM

## 2024-09-08 PROCEDURE — G0382 LEV 3 HOSP TYPE B ED VISIT: HCPCS | Performed by: FAMILY MEDICINE

## 2024-09-08 RX ORDER — CEPHALEXIN 500 MG/1
500 CAPSULE ORAL EVERY 12 HOURS SCHEDULED
Qty: 14 CAPSULE | Refills: 0 | Status: SHIPPED | OUTPATIENT
Start: 2024-09-08 | End: 2024-09-15

## 2024-09-08 NOTE — PATIENT INSTRUCTIONS
Patient is afebrile and does not have any visual disturbance.  I prescribed Keflex 500 mg twice a day for 7 days.  Advised to apply warm compress if needed.  If redness swelling persist or worsen, patient given outpatient ophthalmology referral.    Patient Education     Cellulitis and erysipelas (skin infections)   The Basics   Written by the doctors and editors at [facility]   What are cellulitis and erysipelas? -- Cellulitis and erysipelas are both infections of the skin. These infections can cause redness, pain, and swelling. The difference between them is that erysipelas tends to affect the upper layers of skin, and cellulitis tends to affect deep layers of skin and sometimes the fat under the skin.  Cellulitis and erysipelas can happen when germs get into the skin. Normally, different types of germs live on a person's skin. Most of the time, these germs do not cause any problems. But if a person gets a cut or a break in the skin, the germs can get into the skin and cause an infection.  Certain conditions can increase a person's chance of getting cellulitis or erysipelas. These include:   Having a cut (even a tiny one)   Having another type of skin infection or a long-term skin condition   Having swelling of the skin or swelling in the body   Being overweight  What are the symptoms of cellulitis and erysipelas? -- Both types of infection cause very similar symptoms. Either infection can cause the infected area to be painful, red, swollen, or warm. Some people with cellulitis or erysipelas can sometimes also have fever or chills. And sometimes, people with these infections have no symptoms or only some of these symptoms.  Most of the time, cellulitis and erysipelas happen on the legs or arms. But people can get these infections in other places, such as the belly, face, in the mouth, or around the anus.  Will I need tests? -- Most people do not need any tests. Your doctor or nurse will do an exam and look at your  "skin.  It's important for a doctor or nurse to do an exam to figure out what kind of infection you have. The right treatment for a skin infection depends on the type of infection it is and which germs are causing it. Your doctor or nurse might need to do tests to figure out the cause of your infection.  If you have cellulitis or erysipelas, it's important to get treated. These infections can spread to the whole body and become serious if not treated.  How are cellulitis and erysipelas treated? -- These infections are treated with antibiotic pills. If your doctor prescribes medicine for you to take at home, it is important to follow the directions exactly. Take all of the pills you are given, even if you feel better before you finish them. If you do not take all the pills, the infection can come back worse.  People who have severe infections might be treated in the hospital and given antibiotics through a thin tube that goes into a vein, called an \"IV\".  What can I do to help treat my infection? -- You can:   Raise your arm or leg (if your infection is on your arm or leg) to reduce swelling - Raise the arm or leg up above the level of your heart 3 or 4 times a day, for 30 minutes each time.   Keep the infected area clean and dry - You can take a shower or bath, but be sure to pat the area dry with a towel afterward.  Antibiotic ointments or creams do not work to treat cellulitis and erysipelas.  Should I call my doctor or nurse? -- You should call your doctor or nurse if your symptoms do not get better within 3 days of starting treatment. You should also call if the affected area gets:   Bigger   More swollen   More painful  Your doctor or nurse might do another exam or tests to see if you need different medicines.  Can skin infections be prevented? -- Sometimes. If you cut your skin, make sure to wash the area well with soap and water. This can help prevent the area from getting infected. If you have a long-term " skin condition, ask your doctor or nurse what you can do to help prevent infections.  All topics are updated as new evidence becomes available and our peer review process is complete.  This topic retrieved from [employer] on: Feb 26, 2024.  Topic 18438 Version 15.0  Release: 32.2.4 - C32.56  © 2024 UpToDate, Inc. and/or its affiliates. All rights reserved.  Consumer Information Use and Disclaimer   Disclaimer: This generalized information is a limited summary of diagnosis, treatment, and/or medication information. It is not meant to be comprehensive and should be used as a tool to help the user understand and/or assess potential diagnostic and treatment options. It does NOT include all information about conditions, treatments, medications, side effects, or risks that may apply to a specific patient. It is not intended to be medical advice or a substitute for the medical advice, diagnosis, or treatment of a health care provider based on the health care provider's examination and assessment of a patient's specific and unique circumstances. Patients must speak with a health care provider for complete information about their health, medical questions, and treatment options, including any risks or benefits regarding use of medications. This information does not endorse any treatments or medications as safe, effective, or approved for treating a specific patient. UpToDate, Inc. and its affiliates disclaim any warranty or liability relating to this information or the use thereof.The use of this information is governed by the Terms of Use, available at https://www.woltersAblative Solutionsuwer.com/en/know/clinical-effectiveness-terms. 2024© UpToDate, Inc. and its affiliates and/or licensors. All rights reserved.  Copyright   © 2024 UpToDate, Inc. and/or its affiliates. All rights reserved.

## 2024-09-08 NOTE — PROGRESS NOTES
[facility]        NAME: Randolph Morillo is a 39 y.o. male  : 1985    MRN: [id number]  DATE: 2024  TIME: 7:59 PM    Assessment and Plan   Cellulitis of left eyelid [H00.036]  1. Cellulitis of left eyelid  cephalexin (KEFLEX) 500 mg capsule    Ambulatory Referral to Ophthalmology            Patient Instructions       Follow up with PCP in 3-5 days.  Proceed to  ER if symptoms worsen.    If tests have been performed at [facility], our office will contact you with results if changes need to be made to the care plan discussed with you at the visit.  You can review your full results on [facility].    Chief Complaint     Chief Complaint   Patient presents with    Swelling     Patient c/o redness with swelling on his left lower eyelid.          History of Present Illness       39-year-old male here today with complaint of redness and swelling under his left lower eyelid over the last 2 days.  Redness began at the corner of his eye small approximately quarter of nasal and has increased in size.  Denies any itching.  Nontender.  Denies any visual disturbance.  He applied some topical steroid cream with no significant improvement.        Review of Systems   Review of Systems   Constitutional: Negative.    Skin:  Positive for rash.         Current Medications       Current Outpatient Medications:     cephalexin (KEFLEX) 500 mg capsule, Take 1 capsule (500 mg total) by mouth every 12 (twelve) hours for 7 days, Disp: 14 capsule, Rfl: 0    albuterol (ProAir HFA) 90 mcg/act inhaler, Inhale 2 puffs every 4 (four) hours as needed for shortness of breath, Disp: 18 g, Rfl: 0    Ascorbic Acid (vitamin C) 1000 MG tablet, Take 1 tablet (1,000 mg total) by mouth 2 (two) times a day for 7 days, Disp: 14 tablet, Rfl: 0    benzonatate (TESSALON) 200 MG capsule, Take 1 capsule (200 mg total) by mouth 3 (three) times a day as needed for cough, Disp: 20 capsule, Rfl: 0    cholecalciferol (VITAMIN D3) 1,000  units tablet, Take 2 tablets (2,000 Units total) by mouth daily for 7 days, Disp: 14 tablet, Rfl: 0    Multiple Vitamin (multivitamin) capsule, Take 1 capsule by mouth daily, Disp: 30 capsule, Rfl: 0    Current Allergies     Allergies as of 09/08/2024    (No Known Allergies)            The following portions of the patient's history were reviewed and updated as appropriate: allergies, current medications, past family history, past medical history, past social history, past surgical history and problem list.     Past Medical History:   Diagnosis Date    Congenital meatal stenosis 2/20/2019       Past Surgical History:   Procedure Laterality Date    URETHRA SURGERY      age 9 procedure to enlarge urethra       Family History   Problem Relation Age of Onset    No Known Problems Mother     No Known Problems Father          Medications have been verified.        Objective   /87   Pulse 63   Temp 97.7 °F (36.5 °C)   Resp 18   SpO2 97%   No LMP for male patient.       Physical Exam     Physical Exam  Vitals and nursing note reviewed.   Constitutional:       Appearance: Normal appearance.   Skin:     Comments: Left infraorbital maxillary area reveals an area of erythema raised macular lesion measuring approximately 2 cm in circumference.  Warm to touch.  Findings consistent with cellulitis.

## 2024-10-17 ENCOUNTER — APPOINTMENT (OUTPATIENT)
Dept: URGENT CARE | Facility: MEDICAL CENTER | Age: 39
End: 2024-10-17